# Patient Record
Sex: FEMALE | Race: ASIAN | NOT HISPANIC OR LATINO | URBAN - METROPOLITAN AREA
[De-identification: names, ages, dates, MRNs, and addresses within clinical notes are randomized per-mention and may not be internally consistent; named-entity substitution may affect disease eponyms.]

---
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
## 2019-01-30 ENCOUNTER — NURSE TRIAGE (OUTPATIENT)
Dept: NURSING | Facility: CLINIC | Age: 19
End: 2019-01-30

## 2019-01-31 NOTE — TELEPHONE ENCOUNTER
Pt's friend calls and says that pt. Feels very weak and is laying down. Pt. Says that her sinuses hurt. Pt. Will not call 911. Friend says that pt's parents are going to take pt. To an ER tonight.    Reason for Disposition    [1] Difficulty breathing AND [2] severe (struggling for each breath, unable to speak or cry, grunting sounds, severe retractions)    Protocols used: SINUS PAIN OR CONGESTION-PEDIATRIC-

## 2019-02-14 ENCOUNTER — OFFICE VISIT (OUTPATIENT)
Dept: FAMILY MEDICINE | Facility: CLINIC | Age: 19
End: 2019-02-14
Payer: COMMERCIAL

## 2019-02-14 VITALS
RESPIRATION RATE: 20 BRPM | TEMPERATURE: 97 F | WEIGHT: 127 LBS | OXYGEN SATURATION: 96 % | SYSTOLIC BLOOD PRESSURE: 97 MMHG | HEART RATE: 86 BPM | DIASTOLIC BLOOD PRESSURE: 67 MMHG

## 2019-02-14 DIAGNOSIS — L25.9 CONTACT DERMATITIS, UNSPECIFIED CONTACT DERMATITIS TYPE, UNSPECIFIED TRIGGER: ICD-10-CM

## 2019-02-14 DIAGNOSIS — R51.9 NONINTRACTABLE HEADACHE, UNSPECIFIED CHRONICITY PATTERN, UNSPECIFIED HEADACHE TYPE: ICD-10-CM

## 2019-02-14 DIAGNOSIS — J01.90 ACUTE SINUSITIS, RECURRENCE NOT SPECIFIED, UNSPECIFIED LOCATION: Primary | ICD-10-CM

## 2019-02-14 LAB
BASOPHILS # BLD AUTO: 0 10E9/L (ref 0–0.2)
BASOPHILS NFR BLD AUTO: 0.2 %
DIFFERENTIAL METHOD BLD: NORMAL
EOSINOPHIL # BLD AUTO: 0.4 10E9/L (ref 0–0.7)
EOSINOPHIL NFR BLD AUTO: 3.7 %
ERYTHROCYTE [DISTWIDTH] IN BLOOD BY AUTOMATED COUNT: 12.7 % (ref 10–15)
HCT VFR BLD AUTO: 38.4 % (ref 35–47)
HGB BLD-MCNC: 12.9 G/DL (ref 11.7–15.7)
LYMPHOCYTES # BLD AUTO: 2.3 10E9/L (ref 0.8–5.3)
LYMPHOCYTES NFR BLD AUTO: 23.1 %
MCH RBC QN AUTO: 30.9 PG (ref 26.5–33)
MCHC RBC AUTO-ENTMCNC: 33.6 G/DL (ref 31.5–36.5)
MCV RBC AUTO: 92 FL (ref 78–100)
MONOCYTES # BLD AUTO: 0.6 10E9/L (ref 0–1.3)
MONOCYTES NFR BLD AUTO: 6.2 %
NEUTROPHILS # BLD AUTO: 6.8 10E9/L (ref 1.6–8.3)
NEUTROPHILS NFR BLD AUTO: 66.8 %
PLATELET # BLD AUTO: 297 10E9/L (ref 150–450)
RBC # BLD AUTO: 4.18 10E12/L (ref 3.8–5.2)
WBC # BLD AUTO: 10.1 10E9/L (ref 4–11)

## 2019-02-14 PROCEDURE — 85025 COMPLETE CBC W/AUTO DIFF WBC: CPT | Performed by: PHYSICIAN ASSISTANT

## 2019-02-14 PROCEDURE — 99214 OFFICE O/P EST MOD 30 MIN: CPT | Performed by: PHYSICIAN ASSISTANT

## 2019-02-14 PROCEDURE — 36415 COLL VENOUS BLD VENIPUNCTURE: CPT | Performed by: PHYSICIAN ASSISTANT

## 2019-02-14 PROCEDURE — 84443 ASSAY THYROID STIM HORMONE: CPT | Performed by: PHYSICIAN ASSISTANT

## 2019-02-14 PROCEDURE — 82728 ASSAY OF FERRITIN: CPT | Performed by: PHYSICIAN ASSISTANT

## 2019-02-14 PROCEDURE — 80053 COMPREHEN METABOLIC PANEL: CPT | Performed by: PHYSICIAN ASSISTANT

## 2019-02-14 RX ORDER — METHYLPREDNISOLONE 4 MG
TABLET, DOSE PACK ORAL
Qty: 21 TABLET | Refills: 0 | Status: SHIPPED | OUTPATIENT
Start: 2019-02-14

## 2019-02-14 NOTE — LETTER
February 15, 2019      Kilo Solitario  [address] [address]        Dear ,    We are writing to inform you of your test results.    Your test results fall within the expected range(s) or remain unchanged from previous results.  Please continue with current treatment plan.    Resulted Orders   CBC with platelets and differential   Result Value Ref Range    WBC 10.1 4.0 - 11.0 10e9/L    RBC Count 4.18 3.8 - 5.2 10e12/L    Hemoglobin 12.9 11.7 - 15.7 g/dL    Hematocrit 38.4 35.0 - 47.0 %    MCV 92 78 - 100 fl    MCH 30.9 26.5 - 33.0 pg    MCHC 33.6 31.5 - 36.5 g/dL    RDW 12.7 10.0 - 15.0 %    Platelet Count 297 150 - 450 10e9/L    % Neutrophils 66.8 %    % Lymphocytes 23.1 %    % Monocytes 6.2 %    % Eosinophils 3.7 %    % Basophils 0.2 %    Absolute Neutrophil 6.8 1.6 - 8.3 10e9/L    Absolute Lymphocytes 2.3 0.8 - 5.3 10e9/L    Absolute Monocytes 0.6 0.0 - 1.3 10e9/L    Absolute Eosinophils 0.4 0.0 - 0.7 10e9/L    Absolute Basophils 0.0 0.0 - 0.2 10e9/L    Diff Method Automated Method    Comprehensive metabolic panel   Result Value Ref Range    Sodium 137 133 - 144 mmol/L    Potassium 3.9 3.4 - 5.3 mmol/L    Chloride 104 96 - 110 mmol/L    Carbon Dioxide 29 20 - 32 mmol/L    Anion Gap 4 3 - 14 mmol/L    Glucose 83 70 - 99 mg/dL    Urea Nitrogen 11 7 - 30 mg/dL    Creatinine 0.75 0.50 - 1.00 mg/dL    GFR Estimate >90 >60 mL/min/[1.73_m2]      Comment:      Non  GFR Calc  Starting 12/18/2018, serum creatinine based estimated GFR (eGFR) will be   calculated using the Chronic Kidney Disease Epidemiology Collaboration   (CKD-EPI) equation.      GFR Estimate If Black >90 >60 mL/min/[1.73_m2]      Comment:       GFR Calc  Starting 12/18/2018, serum creatinine based estimated GFR (eGFR) will be   calculated using the Chronic Kidney Disease Epidemiology Collaboration   (CKD-EPI) equation.      Calcium 9.1 8.5 - 10.1 mg/dL    Bilirubin Total 0.3 0.2 - 1.3 mg/dL    Albumin 3.8  3.4 - 5.0 g/dL    Protein Total 8.2 6.8 - 8.8 g/dL    Alkaline Phosphatase 51 40 - 150 U/L    ALT 54 (H) 0 - 50 U/L    AST 23 0 - 35 U/L   TSH with free T4 reflex   Result Value Ref Range    TSH 1.04 0.40 - 4.00 mU/L   Ferritin   Result Value Ref Range    Ferritin 90 12 - 150 ng/mL       If you have any questions or concerns, please call the clinic at the number listed above.       Sincerely,        Dayna Barkley PA-C

## 2019-02-14 NOTE — PROGRESS NOTES
SUBJECTIVE:   Kilo Solitario is a 18 year old female who presents to clinic today for the following health issues:      Rash  Onset: 2 days    Description:   Location: ankles and hands  Character: red, circular, bumpy  Itching (Pruritis): YES    Progression of Symptoms:  A little worse    Accompanying Signs & Symptoms:  Fever: no   Body aches or joint pain: no   Sore throat symptoms: YES- for about a month, has not gotten better  Recent cold symptoms: YES- sinus infection 2 weeks ago    History:   Previous similar rash: no     Precipitating factors:   Exposure to similar rash: no   New exposures: None   Recent travel: no     Alleviating factors:  nothing    Therapies Tried and outcome: Lotion- Cetaphil      -Denies exposure to anyone with similar symptoms  -She was in [place] a month ago, did not go hiking, was not in contact with any animals  -Patient does not have any new clothes or socks      Acute Illness   Acute illness concerns: Cold  Onset: 3-4 weeks    Fever: no    Chills/Sweats: no    Headache (location?): YES    Sinus Pressure:no    Conjunctivitis:  no    Ear Pain: no    Rhinorrhea: YES- mild, not as bad as it was    Congestion: no    Sore Throat: YES     Cough: YES    Wheeze: YES    Decreased Appetite: YES    Nausea: when coughing at night she will feel like she needs to vomit    Vomiting: no    Diarrhea:  YES- 1 time a week ago and it had blood in it    Dysuria/Freq.: no    Fatigue/Achiness: YES    Sick/Strep Exposure: no     Therapies Tried and outcome: Theraflu, generic cough syrup     -Her cough is dry, she feels it more in her throat than in her chest  -She went to the ER the second week of her symptoms  -Her congestion has improved but her cough has worsened  -Patient has gotten others sick around her- they have improved but she has gotten worse  -She rates her sinus pressure as 4/10  -Reports having a post nasal drip  -She has not taken any antibiotics    Migraines  -She has had intermittent  migraines for approximately 4 months  -Her migraines are very painful, across her entire head, reports some light headedness when walking, denies double vision  -She is not typically a headachey person  -The migraines seem to be random  -She reports lack of sleep, increased stress, doesn't eat as much as she should be  -Patient has moved into a new apartment  -She is not sexually active  -Denies urinary frequency/urgency    Problem list and histories reviewed & adjusted, as indicated.  Additional history: as documented    ROS:  CONSTITUTIONAL: NEGATIVE for fever, chills, change in weight  INTEGUMENTARY/SKIN: NEGATIVE for worrisome moles or lesions, POSITIVE rashes  EYES: NEGATIVE for vision changes or irritation, see HPI above  ENT/MOUTH: NEGATIVE for ear, mouth problems, POSITIVE throat problems  RESP: NEGATIVE for SOB, POSITIVE significant cough  BREAST: NEGATIVE for masses, tenderness or discharge  CV: NEGATIVE for chest pain, palpitations or peripheral edema  GI: NEGATIVE for nausea, abdominal pain, heartburn, or change in bowel habits  : NEGATIVE for frequency, dysuria, or hematuria  MUSCULOSKELETAL: NEGATIVE for significant arthralgias, POSITIVE myalgias  NEURO: NEGATIVE for weakness, dizziness or paresthesias, see HPI above  ENDOCRINE: NEGATIVE for temperature intolerance, skin/hair changes  HEME: NEGATIVE for bleeding problems  PSYCHIATRIC: NEGATIVE for changes in mood or affect    This document serves as a record of the services and decisions personally performed and made by Dayna Barkley PA-C. It was created on her behalf by Lonnie Pandey, trained medical scribe. The creation of this document is based on the provider's statements to the medical scribe.  Lonnie Pandey 1:26 PM February 14, 2019    There is no problem list on file for this patient.    No past surgical history on file.    Social History     Tobacco Use     Smoking status: Never Smoker     Smokeless tobacco: Never Used   Substance Use  Topics     Alcohol use: No     No family history on file.        Labs reviewed in EPIC  There is no problem list on file for this patient.    No past surgical history on file.    Social History     Tobacco Use     Smoking status: Never Smoker     Smokeless tobacco: Never Used   Substance Use Topics     Alcohol use: No     No family history on file.      Current Outpatient Medications   Medication Sig Dispense Refill     methylPREDNISolone (MEDROL DOSEPAK) 4 MG tablet therapy pack Follow Package Directions 21 tablet 0     norethindrone-ethinyl estradiol (BLISOVI FE 1/20) 1-20 MG-MCG per tablet Take 1 tablet by mouth daily         OBJECTIVE:                                                    BP 97/67   Pulse 86   Temp 97  F (36.1  C) (Oral)   Resp 20   Wt 57.6 kg (127 lb)   SpO2 96%  There is no height or weight on file to calculate BMI.   GENERAL:: healthy, alert and no distress  EYES: Eyes grossly normal to inspection, extraocular movements - intact, and PERRL  HENT: ear canals- normal; TMs- normal; Nose- normal; Mouth- no ulcers, no lesions  NECK: no tenderness, no adenopathy, no asymmetry, no masses, no stiffness; thyroid- normal to palpation  RESP: lungs clear to auscultation - no rales, no rhonchi, no wheezes  CV: regular rates and rhythm, normal S1 S2, no S3 or S4 and no murmur, no click or rub -  SKIN: no suspicious lesions, no rashes, several 1-2 mm papules blanching red and slightly raised on bilateral dorsal wrists and hand, several more similar papules on bilateral lower extremities, large red patch on left posterior lower leg with a scaly surface    NEURO: strength and tone- normal, sensory exam- grossly normal, mentation- intact, speech- normal  PSYCH: Alert and oriented times 3; speech- coherent , normal rate and volume; able to articulate logical thoughts, able to abstract reason, no tangential thoughts, no hallucinations or delusions, affect- normal    Results for orders placed or performed in  visit on 02/14/19 (from the past 24 hour(s))   CBC with platelets and differential   Result Value Ref Range    WBC 10.1 4.0 - 11.0 10e9/L    RBC Count 4.18 3.8 - 5.2 10e12/L    Hemoglobin 12.9 11.7 - 15.7 g/dL    Hematocrit 38.4 35.0 - 47.0 %    MCV 92 78 - 100 fl    MCH 30.9 26.5 - 33.0 pg    MCHC 33.6 31.5 - 36.5 g/dL    RDW 12.7 10.0 - 15.0 %    Platelet Count 297 150 - 450 10e9/L    % Neutrophils 66.8 %    % Lymphocytes 23.1 %    % Monocytes 6.2 %    % Eosinophils 3.7 %    % Basophils 0.2 %    Absolute Neutrophil 6.8 1.6 - 8.3 10e9/L    Absolute Lymphocytes 2.3 0.8 - 5.3 10e9/L    Absolute Monocytes 0.6 0.0 - 1.3 10e9/L    Absolute Eosinophils 0.4 0.0 - 0.7 10e9/L    Absolute Basophils 0.0 0.0 - 0.2 10e9/L    Diff Method Automated Method           ASSESSMENT/PLAN:                                                        ICD-10-CM    1. Acute sinusitis, recurrence not specified, unspecified location J01.90 methylPREDNISolone (MEDROL DOSEPAK) 4 MG tablet therapy pack   2. Nonintractable headache, unspecified chronicity pattern, unspecified headache type R51 CBC with platelets and differential     Comprehensive metabolic panel     TSH with free T4 reflex     Ferritin   3. Contact dermatitis, unspecified contact dermatitis type, unspecified trigger L25.9        Patient Instructions   Start over the counter zyrtec  Stop this when your Medrol pack is done  If rash or cough come back, come back and see me  Prioritize 8 hours of sleep nightly  8 glasses of water daily  Try to eat every 3 hours  Add 20 min of exercise 3 times a week  Return to clinic for any new or worsening symptoms or go to ER Urgent care in off hours          There is no height or weight on file to calculate BMI.       The information in this document, created by the medical scribe for me, accurately reflects the services I personally performed and the decisions made by me. I have reviewed and approved this document for accuracy prior to leaving the  patient care area.  February 14, 2019 1:26 PM    Dayna Diamond [facility]

## 2019-02-14 NOTE — PATIENT INSTRUCTIONS
Start over the counter zyrtec  Stop this when your Medrol pack is done  If rash or cough come back, come back and see me  Prioritize 8 hours of sleep nightly  8 glasses of water daily  Try to eat every 3 hours  Add 20 min of exercise 3 times a week  Return to clinic for any new or worsening symptoms or go to ER Urgent care in off hours

## 2019-02-15 LAB
ALBUMIN SERPL-MCNC: 3.8 G/DL (ref 3.4–5)
ALP SERPL-CCNC: 51 U/L (ref 40–150)
ALT SERPL W P-5'-P-CCNC: 54 U/L (ref 0–50)
ANION GAP SERPL CALCULATED.3IONS-SCNC: 4 MMOL/L (ref 3–14)
AST SERPL W P-5'-P-CCNC: 23 U/L (ref 0–35)
BILIRUB SERPL-MCNC: 0.3 MG/DL (ref 0.2–1.3)
BUN SERPL-MCNC: 11 MG/DL (ref 7–30)
CALCIUM SERPL-MCNC: 9.1 MG/DL (ref 8.5–10.1)
CHLORIDE SERPL-SCNC: 104 MMOL/L (ref 96–110)
CO2 SERPL-SCNC: 29 MMOL/L (ref 20–32)
CREAT SERPL-MCNC: 0.75 MG/DL (ref 0.5–1)
FERRITIN SERPL-MCNC: 90 NG/ML (ref 12–150)
GFR SERPL CREATININE-BSD FRML MDRD: >90 ML/MIN/{1.73_M2}
GLUCOSE SERPL-MCNC: 83 MG/DL (ref 70–99)
POTASSIUM SERPL-SCNC: 3.9 MMOL/L (ref 3.4–5.3)
PROT SERPL-MCNC: 8.2 G/DL (ref 6.8–8.8)
SODIUM SERPL-SCNC: 137 MMOL/L (ref 133–144)
TSH SERPL DL<=0.005 MIU/L-ACNC: 1.04 MU/L (ref 0.4–4)

## 2019-02-15 NOTE — PROGRESS NOTES
SUBJECTIVE:   Kilo Solitario is a 18 year old female who presents to clinic today for the following health issues:      Rash  Onset: 2 days    Description:   Location: ankles and hands  Character: red, circular, bumpy  Itching (Pruritis): YES    Progression of Symptoms:  A little worse    Accompanying Signs & Symptoms:  Fever: no   Body aches or joint pain: no   Sore throat symptoms: YES- for about a month, has not gotten better  Recent cold symptoms: YES- sinus infection 2 weeks ago    History:   Previous similar rash: no     Precipitating factors:   Exposure to similar rash: no   New exposures: None   Recent travel: no     Alleviating factors:  nothing    Therapies Tried and outcome: Lotion- Cetaphil      -Denies exposure to anyone with similar symptoms  -She was in [place] a month ago, did not go hiking, was not in contact with any animals  -Patient does not have any new clothes or socks      Acute Illness   Acute illness concerns: Cold  Onset: 3-4 weeks    Fever: no    Chills/Sweats: no    Headache (location?): YES    Sinus Pressure:no    Conjunctivitis:  no    Ear Pain: no    Rhinorrhea: YES- mild, not as bad as it was    Congestion: no    Sore Throat: YES     Cough: YES    Wheeze: YES    Decreased Appetite: YES    Nausea: when coughing at night she will feel like she needs to vomit    Vomiting: no    Diarrhea:  YES- 1 time a week ago and it had blood in it    Dysuria/Freq.: no    Fatigue/Achiness: YES    Sick/Strep Exposure: no     Therapies Tried and outcome: Theraflu, generic cough syrup     -Her cough is dry, she feels it more in her throat than in her chest  -She went to the ER the second week of her symptoms  -Her congestion has improved but her cough has worsened  -Patient has gotten others sick around her- they have improved but she has gotten worse  -She rates her sinus pressure as 4/10  -Reports having a post nasal drip  -She has not taken any antibiotics    Migraines  -She has had intermittent  migraines for approximately 4 months  -Her migraines are very painful, across her entire head, reports some light headedness when walking, denies double vision  -She is not typically a headachey person  -The migraines seem to be random  -She reports lack of sleep, increased stress, doesn't eat as much as she should be  -Patient has moved into a new apartment  -She is not sexually active  -Denies urinary frequency/urgency    Problem list and histories reviewed & adjusted, as indicated.  Additional history: as documented    ROS:  CONSTITUTIONAL: NEGATIVE for fever, chills, change in weight  INTEGUMENTARY/SKIN: NEGATIVE for worrisome moles or lesions, POSITIVE rashes  EYES: NEGATIVE for vision changes or irritation, see HPI above  ENT/MOUTH: NEGATIVE for ear, mouth problems, POSITIVE throat problems  RESP: NEGATIVE for SOB, POSITIVE significant cough  BREAST: NEGATIVE for masses, tenderness or discharge  CV: NEGATIVE for chest pain, palpitations or peripheral edema  GI: NEGATIVE for nausea, abdominal pain, heartburn, or change in bowel habits  : NEGATIVE for frequency, dysuria, or hematuria  MUSCULOSKELETAL: NEGATIVE for significant arthralgias, POSITIVE myalgias  NEURO: NEGATIVE for weakness, dizziness or paresthesias, see HPI above  ENDOCRINE: NEGATIVE for temperature intolerance, skin/hair changes  HEME: NEGATIVE for bleeding problems  PSYCHIATRIC: NEGATIVE for changes in mood or affect    This document serves as a record of the services and decisions personally performed and made by Dayna Barkley PA-C. It was created on her behalf by Lonnie Pandey, trained medical scribe. The creation of this document is based on the provider's statements to the medical scribe.  Lonnie Pandey 1:26 PM February 14, 2019    There is no problem list on file for this patient.    No past surgical history on file.    Social History     Tobacco Use     Smoking status: Never Smoker     Smokeless tobacco: Never Used   Substance Use  Topics     Alcohol use: No     No family history on file.        Labs reviewed in EPIC  There is no problem list on file for this patient.    No past surgical history on file.    Social History     Tobacco Use     Smoking status: Never Smoker     Smokeless tobacco: Never Used   Substance Use Topics     Alcohol use: No     No family history on file.      Current Outpatient Medications   Medication Sig Dispense Refill     methylPREDNISolone (MEDROL DOSEPAK) 4 MG tablet therapy pack Follow Package Directions 21 tablet 0     norethindrone-ethinyl estradiol (BLISOVI FE 1/20) 1-20 MG-MCG per tablet Take 1 tablet by mouth daily         OBJECTIVE:                                                    BP 97/67   Pulse 86   Temp 97  F (36.1  C) (Oral)   Resp 20   Wt 57.6 kg (127 lb)   SpO2 96%  There is no height or weight on file to calculate BMI.   GENERAL:: healthy, alert and no distress  EYES: Eyes grossly normal to inspection, extraocular movements - intact, and PERRL  HENT: ear canals- normal; TMs- normal; Nose- normal; Mouth- no ulcers, no lesions  NECK: no tenderness, no adenopathy, no asymmetry, no masses, no stiffness; thyroid- normal to palpation  RESP: lungs clear to auscultation - no rales, no rhonchi, no wheezes  CV: regular rates and rhythm, normal S1 S2, no S3 or S4 and no murmur, no click or rub -  SKIN: no suspicious lesions, no rashes, several 1-2 mm papules blanching red and slightly raised on bilateral dorsal wrists and hand, several more similar papules on bilateral lower extremities, large red patch on left posterior lower leg with a scaly surface    NEURO: strength and tone- normal, sensory exam- grossly normal, mentation- intact, speech- normal  PSYCH: Alert and oriented times 3; speech- coherent , normal rate and volume; able to articulate logical thoughts, able to abstract reason, no tangential thoughts, no hallucinations or delusions, affect- normal    Results for orders placed or performed in  visit on 02/14/19 (from the past 24 hour(s))   CBC with platelets and differential   Result Value Ref Range    WBC 10.1 4.0 - 11.0 10e9/L    RBC Count 4.18 3.8 - 5.2 10e12/L    Hemoglobin 12.9 11.7 - 15.7 g/dL    Hematocrit 38.4 35.0 - 47.0 %    MCV 92 78 - 100 fl    MCH 30.9 26.5 - 33.0 pg    MCHC 33.6 31.5 - 36.5 g/dL    RDW 12.7 10.0 - 15.0 %    Platelet Count 297 150 - 450 10e9/L    % Neutrophils 66.8 %    % Lymphocytes 23.1 %    % Monocytes 6.2 %    % Eosinophils 3.7 %    % Basophils 0.2 %    Absolute Neutrophil 6.8 1.6 - 8.3 10e9/L    Absolute Lymphocytes 2.3 0.8 - 5.3 10e9/L    Absolute Monocytes 0.6 0.0 - 1.3 10e9/L    Absolute Eosinophils 0.4 0.0 - 0.7 10e9/L    Absolute Basophils 0.0 0.0 - 0.2 10e9/L    Diff Method Automated Method           ASSESSMENT/PLAN:                                                        ICD-10-CM    1. Acute sinusitis, recurrence not specified, unspecified location J01.90 methylPREDNISolone (MEDROL DOSEPAK) 4 MG tablet therapy pack   2. Contact dermatitis, unspecified contact dermatitis type, unspecified trigger L25.9    3. Nonintractable headache, unspecified chronicity pattern, unspecified headache type R51 CBC with platelets and differential     Comprehensive metabolic panel     TSH with free T4 reflex     Ferritin     Sinusitis with post-nasal drip is likely causing the cough since she reports the cough is stimulated at the throat. The sinus pain has improved so this is likely viral. Unclear etiology of contact dermatitis, but I've asked her to keep a log of any new exposures just in case the rash comes back after finishing the medrol dose pack. The steroid should also help clear up the inflammation and mucus production from her sinusitis and cough. She brought up migraines at the very end, so I've ordered a basic panel of labs and asked her to work on the basics of a healthy lifestyle for now. Follow up in a few weeks. Return to clinic for any new or worsening symptoms or go  to ER Urgent care in off hours    Patient Instructions   Start over the counter zyrtec  Stop this when your Medrol pack is done  If rash or cough come back, come back and see me  Prioritize 8 hours of sleep nightly  8 glasses of water daily  Try to eat every 3 hours  Add 20 min of exercise 3 times a week  Return to clinic for any new or worsening symptoms or go to ER Urgent care in off hours          There is no height or weight on file to calculate BMI.       The information in this document, created by the medical scribe for me, accurately reflects the services I personally performed and the decisions made by me. I have reviewed and approved this document for accuracy prior to leaving the patient care area.  February 14, 2019 1:26 PM    Dayna Barkley  [facility]

## 2019-07-02 ENCOUNTER — OFFICE VISIT - HEALTHEAST (OUTPATIENT)
Dept: FAMILY MEDICINE | Facility: CLINIC | Age: 19
End: 2019-07-02

## 2019-07-02 DIAGNOSIS — Z11.3 SCREEN FOR STD (SEXUALLY TRANSMITTED DISEASE): ICD-10-CM

## 2019-07-03 LAB
C TRACH DNA SPEC QL PROBE+SIG AMP: NEGATIVE
N GONORRHOEA DNA SPEC QL NAA+PROBE: NEGATIVE

## 2019-07-05 ENCOUNTER — COMMUNICATION - HEALTHEAST (OUTPATIENT)
Dept: FAMILY MEDICINE | Facility: CLINIC | Age: 19
End: 2019-07-05

## 2020-03-02 ENCOUNTER — NURSE TRIAGE (OUTPATIENT)
Dept: NURSING | Facility: CLINIC | Age: 20
End: 2020-03-02

## 2020-03-02 NOTE — TELEPHONE ENCOUNTER
"S-(situation): patient is concerned about her menstrual period coming two weeks early.  This is the second time it came early.    B-(background): Patient reports she has been on oral birth control for 4 years and her period has been very regular.      A-(assessment): No immediate concerns; provider may need to be consulted    R-(recommendations): FNA advised to call clinic tomorrow to voice her concerns.  Caller verbalizes understanding.        Additional Information    Negative: Questions mainly about emergency contraception    Negative: Taking a progestin-only birth control pill (contains only progestin; also called the \"minipill\")    Negative: [1] Abdominal pain AND [2] pregnant < 20 weeks    Negative: [1] Vaginal bleeding AND [2] pregnant < 20 weeks    Negative: [1] SEVERE abdominal pain (e.g., excruciating) AND [2] present > 1 hour    Negative: [1] SEVERE vaginal bleeding (i.e., soaking 2 pads or tampons per hour) AND [2] present 2 or more hours    Negative: Patient sounds very sick or weak to the triager    Negative: [1] MODERATE vaginal bleeding (i.e., soaking 1 pad or tampon per hour AND [2] present > 6 hours)    Negative: [1] Constant abdominal pain AND [2] present > 2 hours    Negative: [1] Caller has URGENT question AND [2] triager unable to answer question    Negative: [1] Periods with > 6 soaked pads or tampons per day AND [2] lasts > 7 days    Negative: Needs refill of BCPs    Negative: [1] Caller has NON-URGENT question AND [2] triager unable to answer question    Negative: Pregnant    Negative: Vaginal bleeding with > 6 soaked pads or tampons per day    Negative: Vaginal bleeding lasts > 10 days    Negative: [1] Bleeding or spotting between regular periods AND [2] occurs more than 3 months    Negative: [1] Irregular bleeding or spotting AND [2] caller can't be reassured or has other questions    Irregular or unexpected bleeding or spotting    Protocols used: CONTRACEPTION - BIRTH CONTROL PILLS - " COMBINED-A-AH

## 2020-03-27 ENCOUNTER — OFFICE VISIT - HEALTHEAST (OUTPATIENT)
Dept: FAMILY MEDICINE | Facility: CLINIC | Age: 20
End: 2020-03-27

## 2020-03-27 DIAGNOSIS — Z78.9 USES BIRTH CONTROL: ICD-10-CM

## 2021-03-13 ENCOUNTER — COMMUNICATION - HEALTHEAST (OUTPATIENT)
Dept: FAMILY MEDICINE | Facility: CLINIC | Age: 21
End: 2021-03-13

## 2021-03-13 DIAGNOSIS — Z78.9 USES BIRTH CONTROL: ICD-10-CM

## 2021-04-21 ENCOUNTER — AMBULATORY - HEALTHEAST (OUTPATIENT)
Dept: NURSING | Facility: CLINIC | Age: 21
End: 2021-04-21

## 2021-05-12 ENCOUNTER — AMBULATORY - HEALTHEAST (OUTPATIENT)
Dept: NURSING | Facility: CLINIC | Age: 21
End: 2021-05-12

## 2021-05-30 ENCOUNTER — COMMUNICATION - HEALTHEAST (OUTPATIENT)
Dept: FAMILY MEDICINE | Facility: CLINIC | Age: 21
End: 2021-05-30

## 2021-05-30 DIAGNOSIS — Z78.9 USES BIRTH CONTROL: ICD-10-CM

## 2021-05-30 NOTE — PROGRESS NOTES
Assessment:   1. Contraception  19 y.o., continuing OCP (estrogen/progesterone), no contraindications. Continue current contraceptives  - JUNEL FE 1/20, 28, 1 mg-20 mcg (21)/75 mg (7) per tablet; Take 1 tablet by mouth daily.  Dispense: 1 Package; Refill: 11    2. Screen for STD (sexually transmitted disease)  - Chlamydia trachomatis & Neisseria gonorrhoeae, Amplified Detection    Plan:   Contraception: OCP (estrogen/progesterone).  Follow up as needed.     Subjective:   iKlo Solitario is a 19 y.o. female who presents for contraception refill. The patient has no complaints today. The patient is sexually active. Pertinent past medical history: none.    Menstrual History:  OB History    None        Menarche age: 11  No LMP recorded.     The following portions of the patient's history were reviewed and updated as appropriate: allergies, current medications, past family history, past medical history, past social history, past surgical history and problem list.  Review of Systems  Pertinent items are noted in HPI.     Objective:   BP 95/59   Pulse 61   Wt 124 lb 2 oz (56.3 kg)   SpO2 98%   General appearance: alert, appears stated age and cooperative  Head: Normocephalic, without obvious abnormality, atraumatic  Eyes: conjunctivae/corneas clear. PERRL, EOM's intact. Fundi benign.  Throat: lips, mucosa, and tongue normal; teeth and gums normal  Lungs: clear to auscultation bilaterally  Heart: regular rate and rhythm, S1, S2 normal, no murmur, click, rub or gallop  Pulses: 2+ and symmetric  Skin: Skin color, texture, turgor normal. No rashes or lesions  Lymph nodes: Cervical, supraclavicular, and axillary nodes normal.  Neurologic: Grossly normal

## 2021-05-30 NOTE — TELEPHONE ENCOUNTER
----- Message from NADIA Gallardo sent at 7/3/2019  5:22 PM CDT -----  Negative for chlamydia and gonorrhea.

## 2021-06-03 VITALS — WEIGHT: 124.13 LBS

## 2021-06-07 NOTE — PROGRESS NOTES
"Kilo Solitario is a 19 y.o. female who is being evaluated via a billable telephone visit.      The patient has been notified of following:     \"This telephone visit will be conducted via a call between you and your physician/provider. We have found that certain health care needs can be provided without the need for a physical exam.  This service lets us provide the care you need with a short phone conversation.  If a prescription is necessary we can send it directly to your pharmacy.  If lab work is needed we can place an order for that and you can then stop by our lab to have the test done at a later time.    If during the course of the call the physician/provider feels a telephone visit is not appropriate, you will not be charged for this service.\"         Kilo Solitario complains of    Chief Complaint   Patient presents with     Menstrual Problem     not getting menses during placebo week, does still get it, the last two months has been getting menses a week before placebo       I have reviewed and updated the patient's Past Medical History, Social History, Family History and Medication List.    ALLERGIES  Patient has no known allergies.    Jessika Noguera LPN      "

## 2021-06-07 NOTE — PROGRESS NOTES
"PHONE VISIT  Due to current COVID19 pandemic, pt was offered virtual visit in lieu of in office evaluation to limit exposures.      Assessment/plan  Kilo Solitario is a 19 y.o. female who is new to my practice.    Uses birth control, irregular menses despite OCP use x 2 cycles  Reviewed if she gets her period this month before placebo week, then she can skip placebo week and go straight to next pack.   If cycle normalizes, then she should get period during placebo week of the next pack.   Can also consider continuous OCP for 2 month trial.   If these interventions don't work, can switch to higher estrogen OCP such as sprintec, currently not her preferred route.  We reviewed option for pelvic ultrasound or labs (TSH, CBC, etc) down the road if sx not improving but with current pandemic risks outweigh benefits for this testing.    - JUNEL FE 1/20, 28, 1 mg-20 mcg (21)/75 mg (7) per tablet; Take 1 tablet by mouth daily.       COVID19 precautions reviewed, questions answered. Referred to CDC for latest updates.     Telephone visit start time: 1:44pm  Telephone visit end time: 2:00pm  Total time: 16 min    Follow up: 3-6 months for annual exam, first pap, HPV series, Td    Karen Rodriguez MD    Subjective:      HPI: Kilo Solitario is a 19 y.o. female who is being evaluated via a billable telephone visit due to COVID19 pandemic precautions.    Chief Complaint   Patient presents with     Menstrual Problem     not getting menses during placebo week, does still get it, the last two months has been getting menses a week before placebo       Menstrual problem: Two months ago she got her \"period\" 1 week before her placebo pills. She thought it was due to taking the pills a little late. But this next cycle she had the same issue.     She has been on Junel OCP for 3 years. She has been very consistent taking the pill every single day around the same time every day as well execpt two months ago when she had her first " menstrual irregularity as discussed above.     Normally her withdrawal period/menses started the third day of taking the placebo pills. Bleeds for 3-4 days.  With these last two cycles, she noticed the period started around day 11, a week and a half before placebo pills start. During the first month, she actually had bleeding for a full week.   She just started her 2nd week of regular pills.     She denies any new change in stress levels.   States her pills look the same from the pharmacy, no formulary changes that she is aware of.   No weight changes.      Patient's last menstrual period was 03/08/2020 (approximate).    Review of Systems:  No weight loss, fevers or chills. Denies hair loss, constipation, diarrhea. No hot/cold intolerance.     12 point comprehensive review of systems was negative except as noted and HPI     Social History:  Nonsmoker    Medical History:   I have reviewed and updated the patient's Past Medical History, Social History, Family History and Medication List.    Medications:  Current Outpatient Medications   Medication Sig Dispense Refill     JUNEL FE 1/20, 28, 1 mg-20 mcg (21)/75 mg (7) per tablet Take 1 tablet by mouth daily. 3 Package 3     No current facility-administered medications for this visit.        Imaging & Labs reviewed this visit: none      Objective:      There were no vitals filed for this visit.    Physical Exam: Not performed in context of phone visit    Karen Rodriguez MD

## 2021-06-15 NOTE — TELEPHONE ENCOUNTER
Refill Approved    Rx renewed per Medication Renewal Policy. Medication was last renewed on 3/27/20, last OV 3/27/20.    Tamika Vargas, [facility] Triage/Med Refill 3/13/2021     Requested Prescriptions   Pending Prescriptions Disp Refills     JUNEL FE 1/20, 28, 1 mg-20 mcg (21)/75 mg (7) per tablet [Pharmacy Med Name: JUNEL FE 1 MG-20 MCG TABLET] 84 tablet 3     Sig: TAKE 1 TABLET BY MOUTH EVERY DAY       Oral Contraceptives Protocol Passed - 3/13/2021  9:29 AM        Passed - Visit with PCP or prescribing provider visit in last 12 months      Last office visit with prescriber/PCP: Visit date not found OR same dept: Visit date not found OR same specialty: 7/2/2019 Lupe Gonzales, [facility]  Last physical: Visit date not found Last MTM visit: Visit date not found   Next visit within 3 mo: Visit date not found  Next physical within 3 mo: Visit date not found  Prescriber OR PCP: Karen Rodriguez MD  Last diagnosis associated with med order: 1. Uses birth control  - JUNEL FE 1/20, 28, 1 mg-20 mcg (21)/75 mg (7) per tablet [Pharmacy Med Name: JUNEL FE 1 MG-20 MCG TABLET]; TAKE 1 TABLET BY MOUTH EVERY DAY  Dispense: 84 tablet; Refill: 3    If protocol passes may refill for 12 months if within 3 months of last provider visit (or a total of 15 months).

## 2021-06-17 ENCOUNTER — RECORDS - HEALTHEAST (OUTPATIENT)
Dept: ADMINISTRATIVE | Facility: OTHER | Age: 21
End: 2021-06-17

## 2021-06-25 ENCOUNTER — COMMUNICATION - HEALTHEAST (OUTPATIENT)
Dept: FAMILY MEDICINE | Facility: CLINIC | Age: 21
End: 2021-06-25

## 2021-06-25 NOTE — TELEPHONE ENCOUNTER
RN cannot approve Refill Request    RN can NOT refill this medication Protocol failed and NO refill given. Last office visit: Visit date not found Last Physical: Visit date not found Last MTM visit: Visit date not found Last visit same specialty: 7/2/2019 Lupe Gonzales FNP.  Next visit within 3 mo: Visit date not found  Next physical within 3 mo: Visit date not found      Carolyn Katz, [facility] Connection Triage/Med Refill 5/30/2021    Requested Prescriptions   Pending Prescriptions Disp Refills     JUNEL FE 1/20, 28, 1 mg-20 mcg (21)/75 mg (7) per tablet [Pharmacy Med Name: JUNEL FE 1 MG-20 MCG TABLET] 84 tablet 0     Sig: TAKE 1 TABLET BY MOUTH EVERY DAY       Oral Contraceptives Protocol Failed - 5/30/2021  9:29 AM        Failed - Visit with PCP or prescribing provider visit in last 12 months      Last office visit with prescriber/PCP: Visit date not found OR same dept: Visit date not found OR same specialty: 7/2/2019 Lupe Gonzales, [facility]  Last physical: Visit date not found Last MTM visit: Visit date not found   Next visit within 3 mo: Visit date not found  Next physical within 3 mo: Visit date not found  Prescriber OR PCP: Karen Rodriguez MD  Last diagnosis associated with med order: 1. Uses birth control  - JUNEL FE 1/20, 28, 1 mg-20 mcg (21)/75 mg (7) per tablet [Pharmacy Med Name: JUNEL FE 1 MG-20 MCG TABLET]; TAKE 1 TABLET BY MOUTH EVERY DAY  Dispense: 84 tablet; Refill: 0    If protocol passes may refill for 12 months if within 3 months of last provider visit (or a total of 15 months).

## 2021-07-04 NOTE — LETTER
Letter by Iraida Cooper RN at      Author: Iraida Cooper RN Service: -- Author Type: --    Filed:  Encounter Date: 6/25/2021 Status: (Other)         Kilo Solitario  [address] [address]             June 25, 2021         Dear MsYasmin Solitario,    We are happy to inform you that your recent Pap smear is normal.    It is recommended that you have your next Pap smear in 3 years. You will also need to return to the clinic every year for an annual wellness visit.    If you have additional questions regarding this result, please contact our office and we will be happy to assist you.      Sincerely,    Your [facility] Team

## 2021-07-16 ENCOUNTER — TRANSFERRED RECORDS (OUTPATIENT)
Dept: HEALTH INFORMATION MANAGEMENT | Facility: CLINIC | Age: 21
End: 2021-07-16

## 2021-10-09 ENCOUNTER — HEALTH MAINTENANCE LETTER (OUTPATIENT)
Age: 21
End: 2021-10-09

## 2022-09-17 ENCOUNTER — HEALTH MAINTENANCE LETTER (OUTPATIENT)
Age: 22
End: 2022-09-17

## 2023-10-07 ENCOUNTER — HEALTH MAINTENANCE LETTER (OUTPATIENT)
Age: 23
End: 2023-10-07